# Patient Record
Sex: MALE | Race: WHITE | NOT HISPANIC OR LATINO | Employment: FULL TIME | URBAN - METROPOLITAN AREA
[De-identification: names, ages, dates, MRNs, and addresses within clinical notes are randomized per-mention and may not be internally consistent; named-entity substitution may affect disease eponyms.]

---

## 2023-06-21 LAB
FERRITIN (UG/LL) IN SER/PLAS: 27 UG/L (ref 20–300)
IRON (UG/DL) IN SER/PLAS: 63 UG/DL (ref 35–150)
IRON BINDING CAPACITY (UG/DL) IN SER/PLAS: 481 UG/DL (ref 240–445)
IRON SATURATION (%) IN SER/PLAS: 13 % (ref 25–45)

## 2025-01-07 ASSESSMENT — ENCOUNTER SYMPTOMS
SWEATS: 0
RHINORRHEA: 1
FEVER: 0
CHILLS: 0
SORE THROAT: 1
WHEEZING: 0
WEIGHT LOSS: 0
COUGH: 1
SHORTNESS OF BREATH: 0
HEADACHES: 0
MYALGIAS: 0
HEARTBURN: 0
HEMOPTYSIS: 0

## 2025-01-08 ENCOUNTER — OFFICE VISIT (OUTPATIENT)
Dept: PRIMARY CARE | Facility: CLINIC | Age: 55
End: 2025-01-08
Payer: COMMERCIAL

## 2025-01-08 VITALS
TEMPERATURE: 98.2 F | HEART RATE: 85 BPM | DIASTOLIC BLOOD PRESSURE: 90 MMHG | OXYGEN SATURATION: 97 % | SYSTOLIC BLOOD PRESSURE: 130 MMHG | BODY MASS INDEX: 27.41 KG/M2 | WEIGHT: 191 LBS

## 2025-01-08 DIAGNOSIS — E55.9 VITAMIN D DEFICIENCY: ICD-10-CM

## 2025-01-08 DIAGNOSIS — Z13.220 ENCOUNTER FOR LIPID SCREENING FOR CARDIOVASCULAR DISEASE: ICD-10-CM

## 2025-01-08 DIAGNOSIS — R74.8 ELEVATED LIVER ENZYMES: ICD-10-CM

## 2025-01-08 DIAGNOSIS — Z13.29 SCREENING FOR THYROID DISORDER: ICD-10-CM

## 2025-01-08 DIAGNOSIS — J06.9 UPPER RESPIRATORY TRACT INFECTION, UNSPECIFIED TYPE: ICD-10-CM

## 2025-01-08 DIAGNOSIS — Z13.6 ENCOUNTER FOR LIPID SCREENING FOR CARDIOVASCULAR DISEASE: ICD-10-CM

## 2025-01-08 DIAGNOSIS — R05.1 ACUTE COUGH: Primary | ICD-10-CM

## 2025-01-08 PROBLEM — G47.33 OSA (OBSTRUCTIVE SLEEP APNEA): Status: ACTIVE | Noted: 2025-01-08

## 2025-01-08 PROBLEM — L23.7 CONTACT DERMATITIS DUE TO POISON IVY: Status: ACTIVE | Noted: 2025-01-08

## 2025-01-08 PROBLEM — G47.9 DIFFICULTY SLEEPING: Status: ACTIVE | Noted: 2025-01-08

## 2025-01-08 PROBLEM — K90.0 CELIAC DISEASE (HHS-HCC): Status: ACTIVE | Noted: 2025-01-08

## 2025-01-08 PROBLEM — R79.89 LOW TESTOSTERONE: Status: ACTIVE | Noted: 2025-01-08

## 2025-01-08 PROBLEM — R91.1 PULMONARY NODULE: Status: ACTIVE | Noted: 2025-01-08

## 2025-01-08 PROBLEM — G25.81 RESTLESS LEG SYNDROME: Status: ACTIVE | Noted: 2025-01-08

## 2025-01-08 PROBLEM — I10 HYPERTENSION: Status: ACTIVE | Noted: 2025-01-08

## 2025-01-08 PROBLEM — G47.19 EXCESSIVE DAYTIME SLEEPINESS: Status: ACTIVE | Noted: 2025-01-08

## 2025-01-08 PROBLEM — D64.9 ABSOLUTE ANEMIA: Status: ACTIVE | Noted: 2025-01-08

## 2025-01-08 LAB
25(OH)D3 SERPL-MCNC: 31 NG/ML (ref 30–100)
ALBUMIN SERPL BCP-MCNC: 4.7 G/DL (ref 3.4–5)
ALP SERPL-CCNC: 61 U/L (ref 33–120)
ALT SERPL W P-5'-P-CCNC: 54 U/L (ref 10–52)
ANION GAP SERPL CALCULATED.3IONS-SCNC: 12 MMOL/L (ref 10–20)
AST SERPL W P-5'-P-CCNC: 27 U/L (ref 9–39)
BASOPHILS # BLD AUTO: 0.05 X10*3/UL (ref 0–0.1)
BASOPHILS NFR BLD AUTO: 1.1 %
BILIRUB SERPL-MCNC: 0.5 MG/DL (ref 0–1.2)
BUN SERPL-MCNC: 15 MG/DL (ref 6–23)
CALCIUM SERPL-MCNC: 9.8 MG/DL (ref 8.6–10.3)
CHLORIDE SERPL-SCNC: 102 MMOL/L (ref 98–107)
CHOLEST SERPL-MCNC: 180 MG/DL (ref 0–199)
CHOLEST/HDLC SERPL: 5.4 {RATIO}
CO2 SERPL-SCNC: 29 MMOL/L (ref 21–32)
CREAT SERPL-MCNC: 0.95 MG/DL (ref 0.5–1.3)
EGFRCR SERPLBLD CKD-EPI 2021: >90 ML/MIN/1.73M*2
EOSINOPHIL # BLD AUTO: 0.18 X10*3/UL (ref 0–0.7)
EOSINOPHIL NFR BLD AUTO: 3.9 %
ERYTHROCYTE [DISTWIDTH] IN BLOOD BY AUTOMATED COUNT: 12.1 % (ref 11.5–14.5)
GLUCOSE SERPL-MCNC: 87 MG/DL (ref 74–99)
HCT VFR BLD AUTO: 46 % (ref 41–52)
HDLC SERPL-MCNC: 33.1 MG/DL
HGB BLD-MCNC: 16.1 G/DL (ref 13.5–17.5)
IMM GRANULOCYTES # BLD AUTO: 0.02 X10*3/UL (ref 0–0.7)
IMM GRANULOCYTES NFR BLD AUTO: 0.4 % (ref 0–0.9)
LDLC SERPL CALC-MCNC: 133 MG/DL
LYMPHOCYTES # BLD AUTO: 0.87 X10*3/UL (ref 1.2–4.8)
LYMPHOCYTES NFR BLD AUTO: 18.9 %
MCH RBC QN AUTO: 29.7 PG (ref 26–34)
MCHC RBC AUTO-ENTMCNC: 35 G/DL (ref 32–36)
MCV RBC AUTO: 85 FL (ref 80–100)
MONOCYTES # BLD AUTO: 0.54 X10*3/UL (ref 0.1–1)
MONOCYTES NFR BLD AUTO: 11.7 %
NEUTROPHILS # BLD AUTO: 2.94 X10*3/UL (ref 1.2–7.7)
NEUTROPHILS NFR BLD AUTO: 64 %
NON HDL CHOLESTEROL: 147 MG/DL (ref 0–149)
NRBC BLD-RTO: 0 /100 WBCS (ref 0–0)
PLATELET # BLD AUTO: 298 X10*3/UL (ref 150–450)
POTASSIUM SERPL-SCNC: 4.2 MMOL/L (ref 3.5–5.3)
PROT SERPL-MCNC: 7.5 G/DL (ref 6.4–8.2)
RBC # BLD AUTO: 5.42 X10*6/UL (ref 4.5–5.9)
SODIUM SERPL-SCNC: 139 MMOL/L (ref 136–145)
TRIGL SERPL-MCNC: 72 MG/DL (ref 0–149)
TSH SERPL-ACNC: 1.61 MIU/L (ref 0.44–3.98)
VLDL: 14 MG/DL (ref 0–40)
WBC # BLD AUTO: 4.6 X10*3/UL (ref 4.4–11.3)

## 2025-01-08 PROCEDURE — 87636 SARSCOV2 & INF A&B AMP PRB: CPT | Performed by: NURSE PRACTITIONER

## 2025-01-08 PROCEDURE — 80061 LIPID PANEL: CPT | Performed by: NURSE PRACTITIONER

## 2025-01-08 PROCEDURE — 1036F TOBACCO NON-USER: CPT | Performed by: NURSE PRACTITIONER

## 2025-01-08 PROCEDURE — 3075F SYST BP GE 130 - 139MM HG: CPT | Performed by: NURSE PRACTITIONER

## 2025-01-08 PROCEDURE — 82306 VITAMIN D 25 HYDROXY: CPT | Performed by: NURSE PRACTITIONER

## 2025-01-08 PROCEDURE — 99214 OFFICE O/P EST MOD 30 MIN: CPT | Performed by: NURSE PRACTITIONER

## 2025-01-08 PROCEDURE — 3080F DIAST BP >= 90 MM HG: CPT | Performed by: NURSE PRACTITIONER

## 2025-01-08 PROCEDURE — 80053 COMPREHEN METABOLIC PANEL: CPT | Performed by: NURSE PRACTITIONER

## 2025-01-08 PROCEDURE — 85025 COMPLETE CBC W/AUTO DIFF WBC: CPT | Performed by: NURSE PRACTITIONER

## 2025-01-08 PROCEDURE — 84443 ASSAY THYROID STIM HORMONE: CPT | Performed by: NURSE PRACTITIONER

## 2025-01-08 RX ORDER — TESTOSTERONE CYPIONATE 200 MG/ML
200 INJECTION, SOLUTION INTRAMUSCULAR
COMMUNITY
Start: 2024-07-30 | End: 2025-01-26

## 2025-01-08 RX ORDER — BENZONATATE 200 MG/1
200 CAPSULE ORAL 3 TIMES DAILY PRN
Qty: 42 CAPSULE | Refills: 3 | Status: SHIPPED | OUTPATIENT
Start: 2025-01-08 | End: 2025-07-07

## 2025-01-08 RX ORDER — AZITHROMYCIN 250 MG/1
TABLET, FILM COATED ORAL
Qty: 6 TABLET | Refills: 0 | Status: SHIPPED | OUTPATIENT
Start: 2025-01-08 | End: 2025-01-13

## 2025-01-08 RX ORDER — OSELTAMIVIR PHOSPHATE 75 MG/1
75 CAPSULE ORAL 2 TIMES DAILY
Qty: 10 CAPSULE | Refills: 0 | Status: SHIPPED | OUTPATIENT
Start: 2025-01-08 | End: 2025-01-13

## 2025-01-08 RX ORDER — METHYLPREDNISOLONE 4 MG/1
TABLET ORAL
Qty: 21 TABLET | Refills: 0 | Status: SHIPPED | OUTPATIENT
Start: 2025-01-08 | End: 2025-01-14

## 2025-01-08 ASSESSMENT — ENCOUNTER SYMPTOMS
WHEEZING: 0
MYALGIAS: 0
RHINORRHEA: 1
WEIGHT LOSS: 0
SORE THROAT: 1
FEVER: 0
COUGH: 1
CHILLS: 0
HEADACHES: 0
SHORTNESS OF BREATH: 0
HEMOPTYSIS: 0
HEARTBURN: 0
SWEATS: 0

## 2025-01-08 ASSESSMENT — PATIENT HEALTH QUESTIONNAIRE - PHQ9
2. FEELING DOWN, DEPRESSED OR HOPELESS: NOT AT ALL
SUM OF ALL RESPONSES TO PHQ9 QUESTIONS 1 AND 2: 0
1. LITTLE INTEREST OR PLEASURE IN DOING THINGS: NOT AT ALL

## 2025-01-08 NOTE — PROGRESS NOTES
Subjective   Patient ID: Kj Hoover is a 54 y.o. male who presents for No chief complaint on file..    Here wife has influenza a , has noé sick for a few days motherin law with them, concerned for hersake, not feeling well    Cough  This is a new problem. The current episode started in the past 7 days. The problem has been gradually improving. The problem occurs every few minutes. The cough is Non-productive. Associated symptoms include nasal congestion, rhinorrhea and a sore throat. Pertinent negatives include no chest pain, chills, ear congestion, ear pain, fever, headaches, heartburn, hemoptysis, myalgias, postnasal drip, rash, shortness of breath, sweats, weight loss or wheezing. Nothing aggravates the symptoms. The treatment provided no relief.        Review of Systems   Constitutional:  Negative for chills, fever and weight loss.   HENT:  Positive for rhinorrhea and sore throat. Negative for ear pain and postnasal drip.    Respiratory:  Positive for cough. Negative for hemoptysis, shortness of breath and wheezing.    Cardiovascular:  Negative for chest pain.   Gastrointestinal:  Negative for heartburn.   Musculoskeletal:  Negative for myalgias.   Skin:  Negative for rash.   Neurological:  Negative for headaches.       Objective   /90   Pulse 85   Temp 36.8 °C (98.2 °F)   Wt 86.6 kg (191 lb)   SpO2 97%   BMI 27.41 kg/m²     Physical Exam  Constitutional:       General: He is not in acute distress.     Appearance: Normal appearance.   HENT:      Right Ear: Tympanic membrane normal.      Left Ear: Tympanic membrane normal.      Nose: Congestion present.      Mouth/Throat:      Comments: Red no exudate  Eyes:      Conjunctiva/sclera: Conjunctivae normal.   Cardiovascular:      Rate and Rhythm: Normal rate and regular rhythm.      Pulses: Normal pulses.      Heart sounds: Normal heart sounds. No murmur heard.  Pulmonary:      Breath sounds: Normal breath sounds. No wheezing.      Comments:  Loose brospamcough  Neurological:      Mental Status: He is alert.   Psychiatric:         Behavior: Behavior normal.         Assessment/Plan   Problem List Items Addressed This Visit    None  Visit Diagnoses         Codes    Acute cough    -  Primary R05.1    Relevant Medications    methylPREDNISolone (Medrol Dospak) 4 mg tablets    benzonatate (Tessalon) 200 mg capsule    Other Relevant Orders    Sars-CoV-2 and Influenza A/B PCR    CBC and Auto Differential    Upper respiratory tract infection, unspecified type     J06.9    Relevant Medications    oseltamivir (Tamiflu) 75 mg capsule    Screening for thyroid disorder     Z13.29    Relevant Orders    TSH with reflex to Free T4 if abnormal    Encounter for lipid screening for cardiovascular disease     Z13.220, Z13.6    Relevant Orders    Lipid Panel    Elevated liver enzymes     R74.8    Relevant Orders    Comprehensive Metabolic Panel    Vitamin D deficiency     E55.9    Relevant Orders    Vitamin D 25-Hydroxy,Total (for eval of Vitamin D levels)             Discussed verito;l cl with blood work rest fluids start Tamil, if not getting better over weekend Zithromax sent does not need now

## 2025-01-09 ENCOUNTER — TELEPHONE (OUTPATIENT)
Dept: PRIMARY CARE | Facility: CLINIC | Age: 55
End: 2025-01-09
Payer: COMMERCIAL

## 2025-01-09 DIAGNOSIS — E78.5 HYPERLIPIDEMIA, UNSPECIFIED HYPERLIPIDEMIA TYPE: Primary | ICD-10-CM

## 2025-01-09 LAB
FLUAV RNA RESP QL NAA+PROBE: NOT DETECTED
FLUBV RNA RESP QL NAA+PROBE: NOT DETECTED
SARS-COV-2 ORF1AB RESP QL NAA+PROBE: NOT DETECTED

## 2025-01-09 RX ORDER — ROSUVASTATIN CALCIUM 10 MG/1
10 TABLET, COATED ORAL DAILY
Qty: 100 TABLET | Refills: 0 | Status: SHIPPED | OUTPATIENT
Start: 2025-01-09 | End: 2026-02-13

## 2025-01-27 ASSESSMENT — PROMIS GLOBAL HEALTH SCALE
RATE_GENERAL_HEALTH: VERY GOOD
CARRYOUT_SOCIAL_ACTIVITIES: VERY GOOD
RATE_AVERAGE_FATIGUE: MODERATE
RATE_AVERAGE_PAIN: 1
RATE_SOCIAL_SATISFACTION: VERY GOOD
RATE_QUALITY_OF_LIFE: VERY GOOD
RATE_PHYSICAL_HEALTH: VERY GOOD
EMOTIONAL_PROBLEMS: RARELY
CARRYOUT_PHYSICAL_ACTIVITIES: COMPLETELY
RATE_MENTAL_HEALTH: VERY GOOD

## 2025-01-29 ENCOUNTER — OFFICE VISIT (OUTPATIENT)
Dept: PRIMARY CARE | Facility: CLINIC | Age: 55
End: 2025-01-29
Payer: COMMERCIAL

## 2025-01-29 VITALS
DIASTOLIC BLOOD PRESSURE: 84 MMHG | HEART RATE: 86 BPM | BODY MASS INDEX: 28 KG/M2 | OXYGEN SATURATION: 98 % | RESPIRATION RATE: 16 BRPM | WEIGHT: 195.6 LBS | TEMPERATURE: 97.7 F | SYSTOLIC BLOOD PRESSURE: 136 MMHG | HEIGHT: 70 IN

## 2025-01-29 DIAGNOSIS — E78.5 HYPERLIPIDEMIA, UNSPECIFIED HYPERLIPIDEMIA TYPE: ICD-10-CM

## 2025-01-29 DIAGNOSIS — H93.13 RINGING IN EAR, BILATERAL: ICD-10-CM

## 2025-01-29 DIAGNOSIS — B35.3 TINEA PEDIS OF RIGHT FOOT: ICD-10-CM

## 2025-01-29 DIAGNOSIS — R79.89 LOW TESTOSTERONE: ICD-10-CM

## 2025-01-29 DIAGNOSIS — Z00.00 WELL ADULT EXAM: Primary | ICD-10-CM

## 2025-01-29 DIAGNOSIS — Z12.11 SCREENING FOR COLON CANCER: ICD-10-CM

## 2025-01-29 PROCEDURE — 1036F TOBACCO NON-USER: CPT | Performed by: NURSE PRACTITIONER

## 2025-01-29 PROCEDURE — 3008F BODY MASS INDEX DOCD: CPT | Performed by: NURSE PRACTITIONER

## 2025-01-29 PROCEDURE — 99396 PREV VISIT EST AGE 40-64: CPT | Performed by: NURSE PRACTITIONER

## 2025-01-29 PROCEDURE — 3079F DIAST BP 80-89 MM HG: CPT | Performed by: NURSE PRACTITIONER

## 2025-01-29 PROCEDURE — 3075F SYST BP GE 130 - 139MM HG: CPT | Performed by: NURSE PRACTITIONER

## 2025-01-29 RX ORDER — ATORVASTATIN CALCIUM 20 MG/1
20 TABLET, FILM COATED ORAL DAILY
Qty: 100 TABLET | Refills: 3 | Status: SHIPPED | OUTPATIENT
Start: 2025-01-29 | End: 2026-03-05

## 2025-01-29 RX ORDER — KETOCONAZOLE 200 MG/1
200 TABLET ORAL DAILY
Qty: 14 TABLET | Refills: 0 | Status: SHIPPED | OUTPATIENT
Start: 2025-01-29 | End: 2025-02-12

## 2025-01-29 RX ORDER — KETOCONAZOLE 20 MG/G
CREAM TOPICAL 2 TIMES DAILY PRN
Qty: 60 G | Refills: 0 | Status: SHIPPED | OUTPATIENT
Start: 2025-01-29 | End: 2026-01-29

## 2025-01-29 RX ORDER — NYSTATIN 100000 [USP'U]/G
1 POWDER TOPICAL 2 TIMES DAILY
Qty: 30 G | Refills: 2 | Status: SHIPPED | OUTPATIENT
Start: 2025-01-29 | End: 2026-01-29

## 2025-01-29 ASSESSMENT — PAIN SCALES - GENERAL: PAINLEVEL_OUTOF10: 0-NO PAIN

## 2025-01-29 ASSESSMENT — LIFESTYLE VARIABLES: HOW MANY STANDARD DRINKS CONTAINING ALCOHOL DO YOU HAVE ON A TYPICAL DAY: PATIENT DOES NOT DRINK

## 2025-01-29 NOTE — PROGRESS NOTES
"Subjective   Patient ID: Kj Hoover is a 55 y.o. male who presents for Annual Exam (Pt here for physical. ) and Tinnitus (Pt c/o ringing in ears x years. Pt states worse in the last year).    Pt here for a welllvisit,concerns ringing in ears worse , has not had work up       Review of Systems   HENT:  Positive for tinnitus.    All other systems reviewed and are negative.      Objective   /84   Pulse 86   Temp 36.5 °C (97.7 °F)   Resp 16   Ht 1.778 m (5' 10\")   Wt 88.7 kg (195 lb 9.6 oz)   SpO2 98%   BMI 28.07 kg/m²     Physical Exam    Assessment/Plan   Problem List Items Addressed This Visit             ICD-10-CM    Low testosterone R79.89     Other Visit Diagnoses         Codes    Well adult exam    -  Primary Z00.00    Hyperlipidemia, unspecified hyperlipidemia type     E78.5    Relevant Medications    atorvastatin (Lipitor) 20 mg tablet    Ringing in ear, bilateral     H93.13    Relevant Orders    Referral to ENT    Tinea pedis of right foot     B35.3    Relevant Medications    ketoconazole (NIZOral) 200 mg tablet    ketoconazole (NIZOral) 2 % cream    nystatin (Mycostatin) 100,000 unit/gram powder    Other Relevant Orders    Referral to Dermatology    Screening for colon cancer     Z12.11    Relevant Orders    Referral to Gastroenterology        Recheck chol in 3 to 4 months discussed elevated alt will follow       "

## 2025-02-13 ENCOUNTER — APPOINTMENT (OUTPATIENT)
Dept: SLEEP MEDICINE | Facility: CLINIC | Age: 55
End: 2025-02-13
Payer: COMMERCIAL

## 2025-02-13 VITALS
BODY MASS INDEX: 28.92 KG/M2 | OXYGEN SATURATION: 96 % | HEIGHT: 70 IN | SYSTOLIC BLOOD PRESSURE: 118 MMHG | WEIGHT: 202 LBS | HEART RATE: 90 BPM | DIASTOLIC BLOOD PRESSURE: 68 MMHG

## 2025-02-13 DIAGNOSIS — G25.81 RESTLESS LEG SYNDROME: ICD-10-CM

## 2025-02-13 DIAGNOSIS — G47.33 OBSTRUCTIVE SLEEP APNEA (ADULT) (PEDIATRIC): Primary | ICD-10-CM

## 2025-02-13 DIAGNOSIS — K90.0 CELIAC DISEASE (HHS-HCC): ICD-10-CM

## 2025-02-13 DIAGNOSIS — E83.10 DISORDER OF IRON METABOLISM: ICD-10-CM

## 2025-02-13 PROCEDURE — 1036F TOBACCO NON-USER: CPT | Performed by: INTERNAL MEDICINE

## 2025-02-13 PROCEDURE — 3074F SYST BP LT 130 MM HG: CPT | Performed by: INTERNAL MEDICINE

## 2025-02-13 PROCEDURE — 3078F DIAST BP <80 MM HG: CPT | Performed by: INTERNAL MEDICINE

## 2025-02-13 PROCEDURE — 99214 OFFICE O/P EST MOD 30 MIN: CPT | Performed by: INTERNAL MEDICINE

## 2025-02-13 PROCEDURE — 3008F BODY MASS INDEX DOCD: CPT | Performed by: INTERNAL MEDICINE

## 2025-02-13 ASSESSMENT — SLEEP AND FATIGUE QUESTIONNAIRES
HOW LIKELY ARE YOU TO NOD OFF OR FALL ASLEEP WHILE WATCHING TV: SLIGHT CHANCE OF DOZING
ESS-CHAD TOTAL SCORE: 3
HOW LIKELY ARE YOU TO NOD OFF OR FALL ASLEEP IN A CAR, WHILE STOPPED FOR A FEW MINUTES IN TRAFFIC: WOULD NEVER DOZE
SITING INACTIVE IN A PUBLIC PLACE LIKE A CLASS ROOM OR A MOVIE THEATER: WOULD NEVER DOZE
HOW LIKELY ARE YOU TO NOD OFF OR FALL ASLEEP WHILE SITTING AND TALKING TO SOMEONE: WOULD NEVER DOZE
HOW LIKELY ARE YOU TO NOD OFF OR FALL ASLEEP WHEN YOU ARE A PASSENGER IN A CAR FOR AN HOUR WITHOUT A BREAK: WOULD NEVER DOZE
HOW LIKELY ARE YOU TO NOD OFF OR FALL ASLEEP WHILE SITTING AND READING: SLIGHT CHANCE OF DOZING
HOW LIKELY ARE YOU TO NOD OFF OR FALL ASLEEP WHILE SITTING QUIETLY AFTER LUNCH WITHOUT ALCOHOL: WOULD NEVER DOZE
HOW LIKELY ARE YOU TO NOD OFF OR FALL ASLEEP WHILE LYING DOWN TO REST IN THE AFTERNOON WHEN CIRCUMSTANCES PERMIT: SLIGHT CHANCE OF DOZING

## 2025-02-13 ASSESSMENT — PAIN SCALES - GENERAL: PAINLEVEL_OUTOF10: 0-NO PAIN

## 2025-02-13 NOTE — ASSESSMENT & PLAN NOTE
-RLS education provided today in clinic.   -Avoid caffeine containing beverages, chocolate, nicotine and alcohol as these can make symptoms worse.   -You can try massage, exercise, stretching or warm baths before bed time.   -We will check your ferritin level (a measure of iron stores) and start iron supplementation x 4-6 months if your ferritin level is under 75 and/or %iron saturation is <20%

## 2025-02-13 NOTE — ASSESSMENT & PLAN NOTE
"Kj \"Kj Hoover\"   has sleep apnea and requires treatment.  Kj \"Kj Hoover\" reports that his equipment is not working properly.  Kj \"Kj Wilkinson" 's current CPAP is broken beyond repair.   Motor has exceeded life expectancy  Kj \"Kj Hoover\" demonstrates previous good compliance and benefit from PAP therapy   Kj \"Kj Hoover\" is a REPAP and needs a replacement with remote monitoring capabilities.  Will order sleep study if deemed necessary and order PAP therapy through [employer], and bring him back for 31-90 day compliance  If sleep study is not required, then we will order Auto-PAP 5-15 cmH2O therapy through [employer]  and bring him back for 31-90 day compliance   "

## 2025-02-13 NOTE — ASSESSMENT & PLAN NOTE
Recommend he discuss this further with his PCP, may be secondary to celiac or other cause. He reports colonoscopy is up to date. Recheck iron levels due to RLS and replete if needed

## 2025-02-13 NOTE — LETTER
"2025     Sarah Man, APRN-CNP  [address]  [place] [facility] [address]    Patient: Kj Hoover   YOB: 1970   Date of Visit: 2025       Dear Dr. Sarah Man, APRN-CNP:    Thank you for referring Kj Hoover to me for evaluation. Below are my notes for this consultation.  If you have questions, please do not hesitate to call me. I look forward to following your patient along with you.       Sincerely,     Irvin Quinonez MD      CC: No Recipients  ______________________________________________________________________________________         Patient: Kj Hoover    [id number]  : 1970 -- AGE 55 y.o.    Provider: Irvin Quinonez MD     Location [facility]   Service Date: 2025              [facility]  Followup Visit Note    Subjective  Patient ID: Kj Hoover \"Kj Wilkinson" is a 55 y.o. male who presents for Follow-up.  HPI    Prior Sleep History:  2019: Diagnostic PSG: AHI 48, SpO2 yair 87.8.  Weight 180 pounds, BMI 26.1  2023: Office Visit: Dr. Irvin Quinonez: Continue AutoPap 5 to 15 cm H2O.  Check iron levels for RLS    Current Sleep History:  Kj Wilkinson" presents for follow-up on the management of his sleep apnea which is currently being managed with positive airway pressure therapy.   The patient's current CPAP is broken beyond repair. Kj Wilkinson" needs a replacement with remote monitoring capabilities.   A downloaded compliance report was reviewed and was interpreted by myself as follows:  > 4 hour compliance was 93%, with an average use of 7 hours and 43 minutes, with a residual AHI 2.9 on AutoPap 5 to 15 cm H2O.   He has low iron stores  He has gained 22 pounds since his last study.    Kj Hoover \"Kj Hoover\" reports  good benefit from his device.    ESS: 3     Review of Systems  Review of systems negative except as " "per HPI  Objective  /68   Pulse 90   Ht 1.778 m (5' 10\")   Wt 91.6 kg (202 lb)   SpO2 96%   BMI 28.98 kg/m²    PREVIOUS WEIGHTS:  Wt Readings from Last 3 Encounters:   02/13/25 91.6 kg (202 lb)   01/29/25 88.7 kg (195 lb 9.6 oz)   01/08/25 86.6 kg (191 lb)       Physical Exam  PHYSICAL EXAM: GENERAL: alert pleasant and cooperative no acute distress  PSYCH EXAM: alert,oriented, in NAD with a full range of affect, normal behavior and no psychotic features    Lab Results   Component Value Date    IRON 63 06/21/2023    TIBC 481 (H) 06/21/2023    FERRITIN 27 06/21/2023        Assessment/Plan  Problem List Items Addressed This Visit             ICD-10-CM    Obstructive sleep apnea (adult) (pediatric) - Primary G47.33     Kj Wilkinson"   has sleep apnea and requires treatment.  Kj Wilkinson" reports that his equipment is not working properly.  Kj Wilkinson" 's current CPAP is broken beyond repair.   Motor has exceeded life expectancy  Kj Wilkinson" demonstrates previous good compliance and benefit from PAP therapy   Kj Wilkinson" is a REPAP and needs a replacement with remote monitoring capabilities.  Will order sleep study if deemed necessary and order PAP therapy through [employer], and bring him back for 31-90 day compliance  If sleep study is not required, then we will order Auto-PAP 5-15 cmH2O therapy through [employer],  and bring him back for 31-90 day compliance          Relevant Orders    Positive Airway Pressure (PAP) Therapy    Restless leg syndrome G25.81     -RLS education provided today in clinic.   -Avoid caffeine containing beverages, chocolate, nicotine and alcohol as these can make symptoms worse.   -You can try massage, exercise, stretching or warm baths before bed time.   -We will check your ferritin level (a measure of iron stores) and start iron supplementation x 4-6 months if your ferritin level is under 75 and/or " %iron saturation is <20%         Relevant Orders    Ferritin    Iron and TIBC    Celiac disease ([facility]-HCC) K90.0     Can be a contributing factor for low iron levels. 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 He reports colonoscopy is up to date. Recheck iron levels due to RLS and replete if needed         Relevant Orders    Ferritin    Iron and TIBC

## 2025-02-13 NOTE — PROGRESS NOTES
"     Patient: Kj Hoover    [id number]  : 1970 -- AGE 55 y.o.    Provider: Irvin Quinonez MD     Location [facility]   Service Date: 2025              [facility]  Followup Visit Note    Subjective   Patient ID: Kj Wilkinson" is a 55 y.o. male who presents for Follow-up.  HPI    Prior Sleep History:  2019: Diagnostic PSG: AHI 48, SpO2 yair 87.8.  Weight 180 pounds, BMI 26.1  2023: Office Visit: Dr. Irvin Quinonez: Continue AutoPap 5 to 15 cm H2O.  Check iron levels for RLS    Current Sleep History:  Kj Wilkinson" presents for follow-up on the management of his sleep apnea which is currently being managed with positive airway pressure therapy.   The patient's current CPAP is broken beyond repair. Kj Wilkinson" needs a replacement with remote monitoring capabilities.   A downloaded compliance report was reviewed and was interpreted by myself as follows:  > 4 hour compliance was 93%, with an average use of 7 hours and 43 minutes, with a residual AHI 2.9 on AutoPap 5 to 15 cm H2O.   He has low iron stores  He has gained 22 pounds since his last study.    Kj Wilkinson" reports  good benefit from his device.    ESS: 3     Review of Systems  Review of systems negative except as per HPI  Objective   /68   Pulse 90   Ht 1.778 m (5' 10\")   Wt 91.6 kg (202 lb)   SpO2 96%   BMI 28.98 kg/m²    PREVIOUS WEIGHTS:  Wt Readings from Last 3 Encounters:   25 91.6 kg (202 lb)   25 88.7 kg (195 lb 9.6 oz)   25 86.6 kg (191 lb)       Physical Exam  PHYSICAL EXAM: GENERAL: alert pleasant and cooperative no acute distress  PSYCH EXAM: alert,oriented, in NAD with a full range of affect, normal behavior and no psychotic features    Lab Results   Component Value Date    IRON 63 2023    TIBC 481 (H) 2023    FERRITIN 27 2023        Assessment/Plan " "  Problem List Items Addressed This Visit             ICD-10-CM    Obstructive sleep apnea (adult) (pediatric) - Primary G47.33     Kj Hoover\"   has sleep apnea and requires treatment.  Kj Wilkinson" reports that his equipment is not working properly.  Kj Wilkinson" 's current CPAP is broken beyond repair.   Motor has exceeded life expectancy  Kj Wilkinson" demonstrates previous good compliance and benefit from PAP therapy   Kj Wilkinson" is a REPAP and needs a replacement with remote monitoring capabilities.  Will order sleep study if deemed necessary and order PAP therapy through [employer], and bring him back for 31-90 day compliance  If sleep study is not required, then we will order Auto-PAP 5-15 cmH2O therapy through [employer],  and bring him back for 31-90 day compliance          Relevant Orders    Positive Airway Pressure (PAP) Therapy    Restless leg syndrome G25.81     -RLS education provided today in clinic.   -Avoid caffeine containing beverages, chocolate, nicotine and alcohol as these can make symptoms worse.   -You can try massage, exercise, stretching or warm baths before bed time.   -We will check your ferritin level (a measure of iron stores) and start iron supplementation x 4-6 months if your ferritin level is under 75 and/or %iron saturation is <20%         Relevant Orders    Ferritin    Iron and TIBC    Celiac disease ([facility]) K90.0     Can be a contributing factor for low iron levels. 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 He reports colonoscopy is up to date. Recheck iron levels due to RLS and replete if needed         Relevant Orders    Ferritin    Iron and TIBC            "

## 2025-02-14 LAB
FERRITIN SERPL-MCNC: 82 NG/ML (ref 38–380)
IRON SATN MFR SERPL: 24 % (CALC) (ref 20–48)
IRON SERPL-MCNC: 95 MCG/DL (ref 50–180)
TIBC SERPL-MCNC: 389 MCG/DL (CALC) (ref 250–425)

## 2025-02-17 ENCOUNTER — TELEPHONE (OUTPATIENT)
Dept: GASTROENTEROLOGY | Facility: HOSPITAL | Age: 55
End: 2025-02-17
Payer: COMMERCIAL

## 2025-02-17 NOTE — TELEPHONE ENCOUNTER
Call made to patient to assist with scheduling NPV with Dr. Hernandez.    There was no answer, voicemail was left.

## 2025-03-25 ENCOUNTER — OFFICE VISIT (OUTPATIENT)
Dept: GASTROENTEROLOGY | Facility: HOSPITAL | Age: 55
End: 2025-03-25
Payer: COMMERCIAL

## 2025-03-25 VITALS
SYSTOLIC BLOOD PRESSURE: 138 MMHG | RESPIRATION RATE: 18 BRPM | WEIGHT: 206 LBS | HEART RATE: 88 BPM | TEMPERATURE: 97.3 F | OXYGEN SATURATION: 96 % | BODY MASS INDEX: 29.56 KG/M2 | DIASTOLIC BLOOD PRESSURE: 94 MMHG

## 2025-03-25 DIAGNOSIS — R74.8 ELEVATED LIVER ENZYMES: Primary | ICD-10-CM

## 2025-03-25 DIAGNOSIS — Z12.11 COLON CANCER SCREENING: ICD-10-CM

## 2025-03-25 DIAGNOSIS — R14.0 BLOATING: ICD-10-CM

## 2025-03-25 PROCEDURE — 3075F SYST BP GE 130 - 139MM HG: CPT | Performed by: STUDENT IN AN ORGANIZED HEALTH CARE EDUCATION/TRAINING PROGRAM

## 2025-03-25 PROCEDURE — 99214 OFFICE O/P EST MOD 30 MIN: CPT | Performed by: STUDENT IN AN ORGANIZED HEALTH CARE EDUCATION/TRAINING PROGRAM

## 2025-03-25 PROCEDURE — 99204 OFFICE O/P NEW MOD 45 MIN: CPT | Performed by: STUDENT IN AN ORGANIZED HEALTH CARE EDUCATION/TRAINING PROGRAM

## 2025-03-25 PROCEDURE — 3080F DIAST BP >= 90 MM HG: CPT | Performed by: STUDENT IN AN ORGANIZED HEALTH CARE EDUCATION/TRAINING PROGRAM

## 2025-03-25 RX ORDER — POLYETHYLENE GLYCOL 3350, SODIUM CHLORIDE, SODIUM BICARBONATE, POTASSIUM CHLORIDE 420; 11.2; 5.72; 1.48 G/4L; G/4L; G/4L; G/4L
4000 POWDER, FOR SOLUTION ORAL ONCE
Qty: 4000 ML | Refills: 0 | Status: SHIPPED | OUTPATIENT
Start: 2025-03-25 | End: 2025-03-25

## 2025-03-25 ASSESSMENT — PAIN SCALES - GENERAL: PAINLEVEL_OUTOF10: 0-NO PAIN

## 2025-03-25 NOTE — PROGRESS NOTES
This is a 54yo F w/ PMH of HLD, tinnitus who has been referred for colon cancer screening.    Per pt, he had a colonoscopy >7 yrs ago (results unknown) at [facility]SH. He does endorses sensitivity and occasional intolerance to gluten in terms of bloating and abd discomfort, occasional diarrhea.    Denies abd pain, n/v/d, dysphagia, heartburn, wt loss, 12 point ROS done and neg unless otherwise stated.    Labs Jan 2025: Hb 16, plt 253, Cr 0.97, AST 37, ALT mildly elevated 71, ferritin 82, TIBC% 24%    PMH/PSH: As above    SH: denies smoking, alcohol, IVDU    FH: denies FH of esophageal, gastric or colon CA    PE:    Gen: AXOX3. NAD     HEENT: NC/AT.      Eyes: anicteric sclerae     CV: RRR    Pulm: non labored breathing    Abd: NTND. Tympanitic. no rebound     Ext: no edema    Skin: Non jaundiced     A/P: This is a 54yo F w/ PMH of HLD, tinnitus who has been referred for colon cancer screening.    Per pt, he had a colonoscopy >7 yrs ago (results unknown) at OSH.  No FH.     He does endorses sensitivity and occasional intolerance to gluten in terms of bloating and abd discomfort, occasional diarrhea.    Additionally, ALT is mildly elevated, denies alcohol, likely 2/2 MASLD.    Plan:    -order labs today: Hep B and C serologies, celiac panel  -liver ultrasound  -avg risk screening colonoscopy     We will see you back in 4 months

## 2025-03-25 NOTE — PATIENT INSTRUCTIONS
Thank you for coming to GI clinic, we will:    -order labs today  -please CALL  to schedule liver ultrasound  -please CALL  to schedule your colonoscopy with Dr Shaw Hernandez in May 2025    We will see you back in 4 months

## 2025-03-25 NOTE — LETTER
March 25, 2025     Patient: Kj Hoover   YOB: 1970   Date of Visit: 3/25/2025       To Whom It May Concern:    Kj Hoover was seen in my clinic on 3/25/2025 at 1:00 pm. Please excuse Kj for his absence from work on this day to make the appointment.    If you have any questions or concerns, please don't hesitate to call.         Sincerely,         Shaw Hernandez MD        CC: No Recipients

## 2025-03-29 LAB
GLIADIN IGA SER IA-ACNC: NORMAL
GLIADIN IGG SER IA-ACNC: NORMAL
HBV CORE AB SERPL QL IA: NORMAL
HBV CORE IGM SERPL QL IA: NORMAL
HBV SURFACE AB SERPL IA-ACNC: <5 MIU/ML
HBV SURFACE AG SERPL QL IA: NORMAL
HCV AB SERPL QL IA: NORMAL
IGA SERPL-MCNC: NORMAL MG/DL
TTG IGA SER-ACNC: NORMAL
TTG IGG SER-ACNC: NORMAL

## 2025-03-31 LAB
GLIADIN IGA SER IA-ACNC: 68.2 U/ML
GLIADIN IGG SER IA-ACNC: 14.1 U/ML
HBV CORE AB SERPL QL IA: NORMAL
HBV CORE IGM SERPL QL IA: NORMAL
HBV SURFACE AB SERPL IA-ACNC: <5 MIU/ML
HBV SURFACE AG SERPL QL IA: NORMAL
HCV AB SERPL QL IA: NORMAL
IGA SERPL-MCNC: 317 MG/DL (ref 47–310)
TTG IGA SER-ACNC: 23.4 U/ML
TTG IGG SER-ACNC: 1.2 U/ML

## 2025-04-01 DIAGNOSIS — K90.0 CELIAC DISEASE (HHS-HCC): Primary | ICD-10-CM

## 2025-04-10 ENCOUNTER — HOSPITAL ENCOUNTER (OUTPATIENT)
Dept: RADIOLOGY | Facility: HOSPITAL | Age: 55
Discharge: HOME | End: 2025-04-10
Payer: COMMERCIAL

## 2025-04-10 ENCOUNTER — APPOINTMENT (OUTPATIENT)
Dept: AUDIOLOGY | Facility: CLINIC | Age: 55
End: 2025-04-10
Payer: COMMERCIAL

## 2025-04-10 ENCOUNTER — APPOINTMENT (OUTPATIENT)
Dept: OTOLARYNGOLOGY | Facility: CLINIC | Age: 55
End: 2025-04-10
Payer: COMMERCIAL

## 2025-04-10 VITALS — HEIGHT: 70 IN | WEIGHT: 206 LBS | BODY MASS INDEX: 29.49 KG/M2

## 2025-04-10 DIAGNOSIS — H93.13 TINNITUS OF BOTH EARS: Primary | ICD-10-CM

## 2025-04-10 DIAGNOSIS — H93.8X3 PRESSURE SENSATION IN BOTH EARS: ICD-10-CM

## 2025-04-10 DIAGNOSIS — R74.8 ELEVATED LIVER ENZYMES: ICD-10-CM

## 2025-04-10 PROCEDURE — 3008F BODY MASS INDEX DOCD: CPT | Performed by: OTOLARYNGOLOGY

## 2025-04-10 PROCEDURE — 1036F TOBACCO NON-USER: CPT | Performed by: OTOLARYNGOLOGY

## 2025-04-10 PROCEDURE — 92567 TYMPANOMETRY: CPT

## 2025-04-10 PROCEDURE — 76705 ECHO EXAM OF ABDOMEN: CPT

## 2025-04-10 PROCEDURE — 92557 COMPREHENSIVE HEARING TEST: CPT

## 2025-04-10 PROCEDURE — 99203 OFFICE O/P NEW LOW 30 MIN: CPT | Performed by: OTOLARYNGOLOGY

## 2025-04-10 NOTE — PROGRESS NOTES
"Subjective   Patient ID: Kj Hoover \"Kj Wilkinson" is a 55 y.o. male  HPI  Patient is complaining of a chronic history of bilateral nonpulsatile tinnitus.  He has no otalgia and no otorrhea and no vertigo.  He has not noticed any sudden change in his hearing.    Review of Systems   HENT:  Positive for tinnitus.         Scratchy throat   Eyes:         Watery eyes       Objective   Physical Exam  The following elements of a brief ear nose and throat exam were performed: External ear canals and tympanic membranes, external nose and nasal passages, oral cavity, palpation of the neck, percussion of the face, palpation of the thyroid.    The ear canals and the tympanic membranes are clear and mobile.  The remainder his exam was within normal limits.  Audiogram and tympanogram were ordered obtained and results were reviewed today and reveal borderline normal hearing bilaterally.    Assessment/Plan   Diagnoses and all orders for this visit:  Tinnitus of both ears (Primary)  -     Tympanometry Only; Future  -     Comprehensive hearing test; Future     Chronic bilateral nonpulsatile tinnitus with otherwise normal borderline hearing noted.  The patient was reassured and counseled on tinnitus mitigation and he will follow-up as needed.  "

## 2025-04-10 NOTE — PROGRESS NOTES
"AUDIOLOGIC EVALUATION    Name:  jK Hoover \"Kj Hoover\"  :  1970  Age:  55 y.o.    HISTORY:     Patient reported bilateral tinnitus that he feels has been worsening over the last few months. He endorsed some hearing difficulty, especially in background noise, occasional bilateral ear pressure, and a history of occupational noise exposure.   He  denied tinnitus, otalgia, aural pressure/fullness, otorrhea, dizziness, noise exposure, and prior ear surgeries.    EVALUATION:    See Audiogram.    RESULTS:    Otoscopy:  Right:  Clear canal, normal color and appearance of tympanic membrane.   Left:   Clear canal, normal color and appearance of tympanic membrane.     Tympanometry:  Right:   Normal tympanic membrane mobility and middle ear pressure.  Left:   Normal tympanic membrane mobility and middle ear pressure.    Hearing Sensitivity:  Right: Normal hearing sensitivity.   Left: Normal hearing sensitivity.     Word Recognition Score (NU-6 ordered 1-2):  Right: Excellent (100%) at 50 dBHL.  Left: Excellent (100%) at 50 dBHL.    ASSESSMENT AND PLAN:    - Continue medical follow-up with Dr. Dash.  - Counseled regarding tinnitus and tinnitus management strategies.  - Monitor and recheck hearing as warranted.  - Counseled regarding results and recommendations.    NEYDA Shen., B.S.  Audiology Student Extern    Kenny Krishna  Clinical Audiologist    "

## 2025-04-17 ENCOUNTER — APPOINTMENT (OUTPATIENT)
Dept: AUDIOLOGY | Facility: CLINIC | Age: 55
End: 2025-04-17
Payer: COMMERCIAL

## 2025-04-17 ENCOUNTER — APPOINTMENT (OUTPATIENT)
Dept: OTOLARYNGOLOGY | Facility: CLINIC | Age: 55
End: 2025-04-17
Payer: COMMERCIAL

## 2025-05-06 ENCOUNTER — ANESTHESIA EVENT (OUTPATIENT)
Dept: GASTROENTEROLOGY | Facility: HOSPITAL | Age: 55
End: 2025-05-06
Payer: COMMERCIAL

## 2025-05-06 ENCOUNTER — HOSPITAL ENCOUNTER (OUTPATIENT)
Dept: GASTROENTEROLOGY | Facility: HOSPITAL | Age: 55
Discharge: HOME | End: 2025-05-06
Payer: COMMERCIAL

## 2025-05-06 ENCOUNTER — ANESTHESIA (OUTPATIENT)
Dept: GASTROENTEROLOGY | Facility: HOSPITAL | Age: 55
End: 2025-05-06
Payer: COMMERCIAL

## 2025-05-06 VITALS
HEIGHT: 70 IN | HEART RATE: 77 BPM | WEIGHT: 200 LBS | BODY MASS INDEX: 28.63 KG/M2 | DIASTOLIC BLOOD PRESSURE: 92 MMHG | SYSTOLIC BLOOD PRESSURE: 127 MMHG | OXYGEN SATURATION: 95 % | TEMPERATURE: 97.1 F | RESPIRATION RATE: 13 BRPM

## 2025-05-06 DIAGNOSIS — Z12.11 COLON CANCER SCREENING: Primary | ICD-10-CM

## 2025-05-06 DIAGNOSIS — K90.0 CELIAC DISEASE (HHS-HCC): ICD-10-CM

## 2025-05-06 PROCEDURE — 45385 COLONOSCOPY W/LESION REMOVAL: CPT | Performed by: STUDENT IN AN ORGANIZED HEALTH CARE EDUCATION/TRAINING PROGRAM

## 2025-05-06 PROCEDURE — 7100000010 HC PHASE TWO TIME - EACH INCREMENTAL 1 MINUTE

## 2025-05-06 PROCEDURE — 43239 EGD BIOPSY SINGLE/MULTIPLE: CPT | Performed by: STUDENT IN AN ORGANIZED HEALTH CARE EDUCATION/TRAINING PROGRAM

## 2025-05-06 PROCEDURE — A45385 PR COLONOSCOPY,REMV LESN,SNARE: Performed by: STUDENT IN AN ORGANIZED HEALTH CARE EDUCATION/TRAINING PROGRAM

## 2025-05-06 PROCEDURE — 2720000007 HC OR 272 NO HCPCS

## 2025-05-06 PROCEDURE — A45385 PR COLONOSCOPY,REMV LESN,SNARE: Performed by: ANESTHESIOLOGIST ASSISTANT

## 2025-05-06 PROCEDURE — 2500000004 HC RX 250 GENERAL PHARMACY W/ HCPCS (ALT 636 FOR OP/ED): Performed by: ANESTHESIOLOGIST ASSISTANT

## 2025-05-06 PROCEDURE — 7100000009 HC PHASE TWO TIME - INITIAL BASE CHARGE

## 2025-05-06 PROCEDURE — 45380 COLONOSCOPY AND BIOPSY: CPT | Performed by: STUDENT IN AN ORGANIZED HEALTH CARE EDUCATION/TRAINING PROGRAM

## 2025-05-06 PROCEDURE — 3700000002 HC GENERAL ANESTHESIA TIME - EACH INCREMENTAL 1 MINUTE

## 2025-05-06 PROCEDURE — 3700000001 HC GENERAL ANESTHESIA TIME - INITIAL BASE CHARGE

## 2025-05-06 RX ORDER — HYDROMORPHONE HYDROCHLORIDE 1 MG/ML
0.2 INJECTION, SOLUTION INTRAMUSCULAR; INTRAVENOUS; SUBCUTANEOUS EVERY 5 MIN PRN
Status: DISCONTINUED | OUTPATIENT
Start: 2025-05-06 | End: 2025-05-07 | Stop reason: HOSPADM

## 2025-05-06 RX ORDER — OXYCODONE HYDROCHLORIDE 5 MG/1
5 TABLET ORAL EVERY 4 HOURS PRN
Status: DISCONTINUED | OUTPATIENT
Start: 2025-05-06 | End: 2025-05-07 | Stop reason: HOSPADM

## 2025-05-06 RX ORDER — DROPERIDOL 2.5 MG/ML
0.62 INJECTION, SOLUTION INTRAMUSCULAR; INTRAVENOUS ONCE AS NEEDED
Status: DISCONTINUED | OUTPATIENT
Start: 2025-05-06 | End: 2025-05-07 | Stop reason: HOSPADM

## 2025-05-06 RX ORDER — ONDANSETRON HYDROCHLORIDE 2 MG/ML
4 INJECTION, SOLUTION INTRAVENOUS ONCE AS NEEDED
Status: DISCONTINUED | OUTPATIENT
Start: 2025-05-06 | End: 2025-05-07 | Stop reason: HOSPADM

## 2025-05-06 RX ORDER — PROPOFOL 10 MG/ML
INJECTION, EMULSION INTRAVENOUS CONTINUOUS PRN
Status: DISCONTINUED | OUTPATIENT
Start: 2025-05-06 | End: 2025-05-06

## 2025-05-06 RX ORDER — SODIUM CHLORIDE, SODIUM LACTATE, POTASSIUM CHLORIDE, CALCIUM CHLORIDE 600; 310; 30; 20 MG/100ML; MG/100ML; MG/100ML; MG/100ML
INJECTION, SOLUTION INTRAVENOUS CONTINUOUS PRN
Status: DISCONTINUED | OUTPATIENT
Start: 2025-05-06 | End: 2025-05-06

## 2025-05-06 RX ORDER — PROCHLORPERAZINE EDISYLATE 5 MG/ML
5 INJECTION INTRAMUSCULAR; INTRAVENOUS ONCE AS NEEDED
Status: DISCONTINUED | OUTPATIENT
Start: 2025-05-06 | End: 2025-05-07 | Stop reason: HOSPADM

## 2025-05-06 RX ORDER — FENTANYL CITRATE 50 UG/ML
INJECTION, SOLUTION INTRAMUSCULAR; INTRAVENOUS AS NEEDED
Status: DISCONTINUED | OUTPATIENT
Start: 2025-05-06 | End: 2025-05-06

## 2025-05-06 RX ORDER — METHOCARBAMOL 100 MG/ML
500 INJECTION, SOLUTION INTRAMUSCULAR; INTRAVENOUS ONCE
Status: DISCONTINUED | OUTPATIENT
Start: 2025-05-06 | End: 2025-05-07 | Stop reason: HOSPADM

## 2025-05-06 RX ORDER — OXYCODONE HYDROCHLORIDE 5 MG/1
10 TABLET ORAL EVERY 4 HOURS PRN
Status: DISCONTINUED | OUTPATIENT
Start: 2025-05-06 | End: 2025-05-07 | Stop reason: HOSPADM

## 2025-05-06 RX ORDER — MIDAZOLAM HYDROCHLORIDE 1 MG/ML
INJECTION, SOLUTION INTRAMUSCULAR; INTRAVENOUS AS NEEDED
Status: DISCONTINUED | OUTPATIENT
Start: 2025-05-06 | End: 2025-05-06

## 2025-05-06 RX ORDER — ACETAMINOPHEN 325 MG/1
650 TABLET ORAL EVERY 4 HOURS PRN
Status: DISCONTINUED | OUTPATIENT
Start: 2025-05-06 | End: 2025-05-07 | Stop reason: HOSPADM

## 2025-05-06 RX ORDER — HYDROMORPHONE HYDROCHLORIDE 1 MG/ML
0.1 INJECTION, SOLUTION INTRAMUSCULAR; INTRAVENOUS; SUBCUTANEOUS EVERY 5 MIN PRN
Status: DISCONTINUED | OUTPATIENT
Start: 2025-05-06 | End: 2025-05-07 | Stop reason: HOSPADM

## 2025-05-06 RX ORDER — HYDROMORPHONE HYDROCHLORIDE 1 MG/ML
0.5 INJECTION, SOLUTION INTRAMUSCULAR; INTRAVENOUS; SUBCUTANEOUS EVERY 5 MIN PRN
Status: DISCONTINUED | OUTPATIENT
Start: 2025-05-06 | End: 2025-05-07 | Stop reason: HOSPADM

## 2025-05-06 RX ADMIN — FENTANYL CITRATE 25 MCG: 50 INJECTION, SOLUTION INTRAMUSCULAR; INTRAVENOUS at 13:17

## 2025-05-06 RX ADMIN — SODIUM CHLORIDE, POTASSIUM CHLORIDE, SODIUM LACTATE AND CALCIUM CHLORIDE: 600; 310; 30; 20 INJECTION, SOLUTION INTRAVENOUS at 12:53

## 2025-05-06 RX ADMIN — FENTANYL CITRATE 25 MCG: 50 INJECTION, SOLUTION INTRAMUSCULAR; INTRAVENOUS at 13:01

## 2025-05-06 RX ADMIN — FENTANYL CITRATE 50 MCG: 50 INJECTION, SOLUTION INTRAMUSCULAR; INTRAVENOUS at 13:04

## 2025-05-06 RX ADMIN — MIDAZOLAM 2 MG: 1 INJECTION INTRAMUSCULAR; INTRAVENOUS at 12:58

## 2025-05-06 RX ADMIN — PROPOFOL 200 MCG/KG/MIN: 10 INJECTION, EMULSION INTRAVENOUS at 12:59

## 2025-05-06 ASSESSMENT — PAIN SCALES - GENERAL
PAINLEVEL_OUTOF10: 0 - NO PAIN

## 2025-05-06 ASSESSMENT — ENCOUNTER SYMPTOMS: ABDOMINAL DISTENTION: 1

## 2025-05-06 ASSESSMENT — PAIN - FUNCTIONAL ASSESSMENT: PAIN_FUNCTIONAL_ASSESSMENT: 0-10

## 2025-05-06 NOTE — ANESTHESIA PREPROCEDURE EVALUATION
"Patient: Kj Hoover \"Kj Hoover\"    Procedure Information       Date/Time: 05/06/25 1110    Scheduled providers: Shaw Hernandez MD    Procedure: COLONOSCOPY    Location: [facility]            Relevant Problems   Anesthesia (within normal limits)      Cardiac   (+) Hypertension      GI  Gluten sensitivity      Hematology   (+) Absolute anemia       Clinical information reviewed:   Tobacco  Allergies  Meds   Med Hx  Surg Hx   Fam Hx  Soc Hx        NPO Detail:  NPO/Void Status  Date of Last Liquid: 05/06/25  Time of Last Liquid: 0500  Date of Last Solid: 05/04/25  Last Intake Type: Clear fluids         Physical Exam    Airway  Mallampati: II  TM distance: >3 FB  Neck ROM: full  Mouth opening: 3 or more finger widths     Cardiovascular   Rhythm: regular  Rate: normal     Dental - normal exam     Pulmonary Breath sounds clear to auscultation     Abdominal            Anesthesia Plan    History of general anesthesia?: no  History of complications of general anesthesia?: no    ASA 2     MAC     intravenous induction   Anesthetic plan and risks discussed with patient.    Plan discussed with [facility].      "

## 2025-05-06 NOTE — ANESTHESIA POSTPROCEDURE EVALUATION
"Patient: Kj Hoover \"Kj Hoover\"    Procedure Summary       Date: 05/06/25 Room / Location: [facility]    Anesthesia Start: 1253 Anesthesia Stop: 1352    Procedure: COLONOSCOPY Diagnosis:       Colon cancer screening      Colon cancer screening    Scheduled Providers: Shaw Hernandez MD Responsible Provider: Patel Reinoso MD    Anesthesia Type: MAC ASA Status: 2            Anesthesia Type: MAC    Vitals Value Taken Time   /86 05/06/25 13:50   Temp 36.2 °C (97.1 °F) 05/06/25 13:50   Pulse 83 05/06/25 13:50   Resp 21 05/06/25 13:50   SpO2 96 % 05/06/25 13:50       Anesthesia Post Evaluation    Patient location during evaluation: PACU  Patient participation: complete - patient participated  Level of consciousness: awake  Pain management: adequate  Airway patency: patent  Cardiovascular status: acceptable  Respiratory status: acceptable  Hydration status: acceptable  Postoperative Nausea and Vomiting: none        No notable events documented.    "

## 2025-05-06 NOTE — DISCHARGE INSTRUCTIONS

## 2025-05-06 NOTE — H&P
"History Of Present Illness  Kj Hoover \"Kj Wilkinson" is a 55 y.o. male presenting for an EGD to confirm celiac disease and screening colonoscopy.    The patient had a colonoscopy >7 yrs ago (results unknown) at [facility]SH.     Past Medical History  Medical History[1]  Surgical History  Surgical History[2]  Social History  He reports that he has never smoked. He has never used smokeless tobacco. He reports that he does not currently use alcohol. He reports that he does not use drugs.    Family History  Family History[3]     Allergies  Allergies[4]  Review of Systems   Gastrointestinal:  Positive for abdominal distention.   All other systems reviewed and are negative.     Physical Exam  Vitals reviewed.   Constitutional:       Appearance: Normal appearance.   Eyes:      Pupils: Pupils are equal, round, and reactive to light.   Cardiovascular:      Rate and Rhythm: Normal rate.      Heart sounds: Normal heart sounds.   Pulmonary:      Breath sounds: Normal breath sounds.   Abdominal:      General: There is distension.      Palpations: Abdomen is soft.      Tenderness: There is no abdominal tenderness. There is no guarding or rebound.   Neurological:      General: No focal deficit present.      Mental Status: He is alert and oriented to person, place, and time.   Psychiatric:         Mood and Affect: Mood normal.         Behavior: Behavior normal.       Last Recorded Vitals  Blood pressure (!) 136/97, pulse 91, temperature 36.8 °C (98.2 °F), temperature source Temporal, resp. rate 16, height 1.778 m (5' 10\"), weight 90.7 kg (200 lb), SpO2 96%.    Assessment/Plan   Kj Hoover \"Kj Wilkinson" is a 55 y.o. male presenting for an EGD to confirm celiac disease and screening colonoscopy.     Ester Hernandez MD         [1]   Past Medical History:  Diagnosis Date    Allergic Spring    Anxiety     Hypertension    [2] History reviewed. No pertinent surgical history.  [3]   Family History  Problem Relation Name Age of Onset "    Heart attack Father 75     Stroke Father 75     Cancer Other      Hypertension Other      Lung disease Other      Allergies Other     [4]   Allergies  Allergen Reactions    Penicillins Unknown    Gluten Diarrhea, GI Upset and Rash

## 2025-05-15 LAB
LAB AP ASR DISCLAIMER: NORMAL
LABORATORY COMMENT REPORT: NORMAL
PATH REPORT.FINAL DX SPEC: NORMAL
PATH REPORT.GROSS SPEC: NORMAL
PATH REPORT.RELEVANT HX SPEC: NORMAL
PATH REPORT.TOTAL CANCER: NORMAL

## 2025-05-23 LAB
ELECTRONICALLY SIGNED BY: NORMAL
H. PYLORI DRUG SUSCEPTIBILITY RESULTS: NORMAL

## 2025-05-23 PROCEDURE — 87900 PHENOTYPE INFECT AGENT DRUG: CPT | Performed by: STUDENT IN AN ORGANIZED HEALTH CARE EDUCATION/TRAINING PROGRAM

## 2025-06-03 ENCOUNTER — APPOINTMENT (OUTPATIENT)
Dept: DERMATOLOGY | Facility: CLINIC | Age: 55
End: 2025-06-03
Payer: COMMERCIAL

## 2025-06-10 ENCOUNTER — APPOINTMENT (OUTPATIENT)
Dept: GASTROENTEROLOGY | Facility: HOSPITAL | Age: 55
End: 2025-06-10
Payer: COMMERCIAL

## 2025-06-10 VITALS
WEIGHT: 207 LBS | SYSTOLIC BLOOD PRESSURE: 125 MMHG | TEMPERATURE: 97.2 F | HEART RATE: 75 BPM | BODY MASS INDEX: 29.7 KG/M2 | OXYGEN SATURATION: 96 % | DIASTOLIC BLOOD PRESSURE: 87 MMHG

## 2025-06-10 DIAGNOSIS — A04.8 BACTERIAL INFECTION DUE TO H. PYLORI: Primary | ICD-10-CM

## 2025-06-10 DIAGNOSIS — K90.0 CELIAC DISEASE (HHS-HCC): ICD-10-CM

## 2025-06-10 PROCEDURE — 3079F DIAST BP 80-89 MM HG: CPT | Performed by: STUDENT IN AN ORGANIZED HEALTH CARE EDUCATION/TRAINING PROGRAM

## 2025-06-10 PROCEDURE — 99215 OFFICE O/P EST HI 40 MIN: CPT | Performed by: STUDENT IN AN ORGANIZED HEALTH CARE EDUCATION/TRAINING PROGRAM

## 2025-06-10 PROCEDURE — 99212 OFFICE O/P EST SF 10 MIN: CPT | Performed by: STUDENT IN AN ORGANIZED HEALTH CARE EDUCATION/TRAINING PROGRAM

## 2025-06-10 PROCEDURE — 1036F TOBACCO NON-USER: CPT | Performed by: STUDENT IN AN ORGANIZED HEALTH CARE EDUCATION/TRAINING PROGRAM

## 2025-06-10 PROCEDURE — 3074F SYST BP LT 130 MM HG: CPT | Performed by: STUDENT IN AN ORGANIZED HEALTH CARE EDUCATION/TRAINING PROGRAM

## 2025-06-10 RX ORDER — CLOTRIMAZOLE AND BETAMETHASONE DIPROPIONATE 10; .64 MG/G; MG/G
CREAM TOPICAL
COMMUNITY
Start: 2025-06-09

## 2025-06-10 RX ORDER — BISMUTH SUBSALICYLATE 262 MG/1
262 TABLET ORAL
Qty: 56 TABLET | Refills: 0 | Status: SHIPPED | OUTPATIENT
Start: 2025-06-10 | End: 2025-06-24

## 2025-06-10 RX ORDER — PANTOPRAZOLE SODIUM 40 MG/1
40 TABLET, DELAYED RELEASE ORAL 2 TIMES DAILY
Qty: 28 TABLET | Refills: 0 | Status: SHIPPED | OUTPATIENT
Start: 2025-06-10 | End: 2025-06-24

## 2025-06-10 RX ORDER — ECONAZOLE NITRATE 10 MG/G
CREAM TOPICAL
COMMUNITY
Start: 2025-04-15

## 2025-06-10 RX ORDER — METRONIDAZOLE 500 MG/1
500 TABLET ORAL 3 TIMES DAILY
Qty: 42 TABLET | Refills: 0 | Status: SHIPPED | OUTPATIENT
Start: 2025-06-10 | End: 2025-06-24

## 2025-06-10 RX ORDER — TETRACYCLINE HYDROCHLORIDE 500 MG/1
500 CAPSULE ORAL 4 TIMES DAILY
Qty: 56 CAPSULE | Refills: 0 | Status: SHIPPED | OUTPATIENT
Start: 2025-06-10 | End: 2025-06-24

## 2025-06-10 SDOH — ECONOMIC STABILITY: FOOD INSECURITY: WITHIN THE PAST 12 MONTHS, YOU WORRIED THAT YOUR FOOD WOULD RUN OUT BEFORE YOU GOT MONEY TO BUY MORE.: NEVER TRUE

## 2025-06-10 SDOH — ECONOMIC STABILITY: FOOD INSECURITY: WITHIN THE PAST 12 MONTHS, THE FOOD YOU BOUGHT JUST DIDN'T LAST AND YOU DIDN'T HAVE MONEY TO GET MORE.: NEVER TRUE

## 2025-06-10 ASSESSMENT — PATIENT HEALTH QUESTIONNAIRE - PHQ9
SUM OF ALL RESPONSES TO PHQ9 QUESTIONS 1 & 2: 0
2. FEELING DOWN, DEPRESSED OR HOPELESS: NOT AT ALL
1. LITTLE INTEREST OR PLEASURE IN DOING THINGS: NOT AT ALL

## 2025-06-10 ASSESSMENT — LIFESTYLE VARIABLES
HOW OFTEN DO YOU HAVE A DRINK CONTAINING ALCOHOL: NEVER
HOW MANY STANDARD DRINKS CONTAINING ALCOHOL DO YOU HAVE ON A TYPICAL DAY: PATIENT DOES NOT DRINK
HOW OFTEN DO YOU HAVE SIX OR MORE DRINKS ON ONE OCCASION: NEVER
SKIP TO QUESTIONS 9-10: 1
AUDIT-C TOTAL SCORE: 0

## 2025-06-10 ASSESSMENT — PAIN SCALES - GENERAL: PAINLEVEL_OUTOF10: 0-NO PAIN

## 2025-06-10 NOTE — LETTER
"Neetu 10, 2025     Patient: Kj Hoover   YOB: 1970   Date of Visit: 6/10/2025       To Whom It May Concern:    Kj Hoover was seen in my clinic on 6/10/2025 at 8:00 am. Please excuse Kj for his absence from work on this day to make the appointment.    If you have any questions or concerns, please don't hesitate to call.         Sincerely,         Shaw Hernandez MD        CC: Cristoferfranklyn BLANCHARD Hoover \"Kj Hoover\"  "

## 2025-06-10 NOTE — PROGRESS NOTES
HPI: This is a 54yo F w/ PMH of HLD, tinnitus who comes in for follow up of HP gastritis and celiac disease.     EGD May 2025 c/w HP gastritis and duodenal bx c/w celiac dz. C-scope with isolaed cecal patch and few SSAs.    Per pt since procedure he has avoided gluten, prior to EGD he has had intermittent ingestion of gluten.     Denies abd pain, n/v/d, dysphagia, heartburn, wt loss, 12 point ROS done and neg unless otherwise stated.     Labs Jan-May 2025: Hb 16, plt 253, Cr 0.97, AST 37, ALT mildly elevated 71, ferritin 82, TIBC% 24%, Hep B and C neg celiac panel wih pos TTG IGA, GLIADIN IGA    US abd 3/25:    1. Hepatomegaly with diffuse steatosis and geographic areas around  the gallbladder likely fat spared region.  2. Right lower pole renal cortical echogenic focus measuring 0.5 cm  with diagnostic consideration including calcification, complex cysts  or tiny angiomyolipoma.     PMH/PSH: As above     SH: denies smoking, alcohol, IVDU     FH: denies FH of esophageal, gastric or colon CA     PE:     Gen: AXOX3. NAD      HEENT: NC/AT.       Eyes: anicteric sclerae      CV: RRR     Pulm: non labored breathing     Abd: NTND. Tympanitic. no rebound      Ext: no edema     Skin: Non jaundiced      A/P: This is a 54yo F w/ PMH of HLD, MASLD (fatty liver), tinnitus who comes in for follow up of HP gastritis and celiac disease.     EGD May 2025 c/w HP gastritis and duodenal bx c/w celiac dz. C-scope with isolated cecal patch and few SSAs.    Per pt since procedure he has avoided gluten, prior to EGD he has had intermittent ingestion of gluten. Denies any alarm sx including wt loss, or diarrhea.     Plan:     -start H.pylori stomach infection therapy for 14 days as directed  -obtain lab tests for micronutrient deficiency associated with celiac disease  -PLEASE CALL  /[phone] to schedule urea breath test in August 2025 to confirm eradication of H.pylori infection  -recommend gluten free diet for celiac  disease  -surveillance colonoscopy in 2028    We will see you back in 3 months    -----I will follow this pt for chronic mgmt of celiac disease------

## 2025-06-10 NOTE — PATIENT INSTRUCTIONS
Thank you for coming to GI clinic, we will:    -start H.pylori stomach infection therapy for 14 days as directed  -obtain lab tests for micronutrient deficiency associated with celiac disease  -PLEASE CALL  /[phone] to schedule urea breath test in August 2025 to confirm eradication of H.pylori infection  -recommend gluten free diet for celiac disease  -surveillance colonoscopy in 2028    We will see you back in 3 months

## 2025-06-14 LAB
A-TOCOPHEROL VIT E SERPL-MCNC: 13 MG/L (ref 5.7–19.9)
BETA+GAMMA TOCOPHEROL SERPL-MCNC: <1 MG/L
CAROTENE SERPL-MCNC: 12 MCG/DL (ref 4–51)
COPPER BLD-MCNC: NORMAL UG/DL
FOLATE SERPL-MCNC: 3.6 NG/ML
MAGNESIUM SERPL-MCNC: 2.2 MG/DL (ref 1.5–2.5)
PYRIDOXAL PHOS SERPL-MCNC: 13.5 NG/ML (ref 2.1–21.7)
SELENIUM SERPL-MCNC: 151 MCG/L (ref 63–160)
VIT A SERPL-MCNC: 65 MCG/DL (ref 38–98)
VIT B1 BLD-SCNC: 130 NMOL/L (ref 78–185)
VIT B12 SERPL-MCNC: 442 PG/ML (ref 200–1100)
ZINC SERPL-MCNC: 60 MCG/DL (ref 60–130)

## 2025-06-17 LAB
A-TOCOPHEROL VIT E SERPL-MCNC: 13 MG/L (ref 5.7–19.9)
BETA+GAMMA TOCOPHEROL SERPL-MCNC: <1 MG/L
CAROTENE SERPL-MCNC: 12 MCG/DL (ref 4–51)
COPPER BLD-MCNC: 76 MCG/DL
FOLATE SERPL-MCNC: 3.6 NG/ML
MAGNESIUM SERPL-MCNC: 2.2 MG/DL (ref 1.5–2.5)
PYRIDOXAL PHOS SERPL-MCNC: 13.5 NG/ML (ref 2.1–21.7)
SELENIUM SERPL-MCNC: 151 MCG/L (ref 63–160)
VIT A SERPL-MCNC: 65 MCG/DL (ref 38–98)
VIT B1 BLD-SCNC: 130 NMOL/L (ref 78–185)
VIT B12 SERPL-MCNC: 442 PG/ML (ref 200–1100)
ZINC SERPL-MCNC: 60 MCG/DL (ref 60–130)

## 2025-07-01 ENCOUNTER — APPOINTMENT (OUTPATIENT)
Dept: GASTROENTEROLOGY | Facility: HOSPITAL | Age: 55
End: 2025-07-01
Payer: COMMERCIAL

## 2025-08-13 LAB — UREA BREATH TEST QL: NOT DETECTED

## 2025-08-26 ENCOUNTER — OFFICE VISIT (OUTPATIENT)
Dept: GASTROENTEROLOGY | Facility: HOSPITAL | Age: 55
End: 2025-08-26
Payer: COMMERCIAL

## 2025-08-26 VITALS
DIASTOLIC BLOOD PRESSURE: 81 MMHG | BODY MASS INDEX: 30.07 KG/M2 | SYSTOLIC BLOOD PRESSURE: 127 MMHG | OXYGEN SATURATION: 96 % | TEMPERATURE: 98.2 F | WEIGHT: 209.6 LBS | HEART RATE: 78 BPM

## 2025-08-26 DIAGNOSIS — R19.7 DIARRHEA, UNSPECIFIED TYPE: ICD-10-CM

## 2025-08-26 DIAGNOSIS — K90.0 CELIAC DISEASE (HHS-HCC): Primary | ICD-10-CM

## 2025-08-26 DIAGNOSIS — K58.0 IRRITABLE BOWEL SYNDROME WITH DIARRHEA: ICD-10-CM

## 2025-08-26 DIAGNOSIS — R14.0 BLOATING: ICD-10-CM

## 2025-08-26 PROCEDURE — 3079F DIAST BP 80-89 MM HG: CPT | Performed by: STUDENT IN AN ORGANIZED HEALTH CARE EDUCATION/TRAINING PROGRAM

## 2025-08-26 PROCEDURE — 99215 OFFICE O/P EST HI 40 MIN: CPT | Performed by: STUDENT IN AN ORGANIZED HEALTH CARE EDUCATION/TRAINING PROGRAM

## 2025-08-26 PROCEDURE — 99212 OFFICE O/P EST SF 10 MIN: CPT

## 2025-08-26 PROCEDURE — 3074F SYST BP LT 130 MM HG: CPT | Performed by: STUDENT IN AN ORGANIZED HEALTH CARE EDUCATION/TRAINING PROGRAM

## 2025-08-26 PROCEDURE — 1036F TOBACCO NON-USER: CPT | Performed by: STUDENT IN AN ORGANIZED HEALTH CARE EDUCATION/TRAINING PROGRAM

## 2025-08-26 RX ORDER — LOPERAMIDE HYDROCHLORIDE 2 MG/1
2 CAPSULE ORAL 3 TIMES DAILY PRN
Qty: 90 CAPSULE | Refills: 1 | Status: SHIPPED | OUTPATIENT
Start: 2025-08-26 | End: 2025-10-25

## 2025-08-26 RX ORDER — SIMETHICONE 80 MG
80 TABLET,CHEWABLE ORAL EVERY 6 HOURS PRN
Qty: 180 TABLET | Refills: 2 | Status: SHIPPED | OUTPATIENT
Start: 2025-08-26 | End: 2026-01-08

## 2025-08-26 ASSESSMENT — ENCOUNTER SYMPTOMS
LOSS OF SENSATION IN FEET: 0
OCCASIONAL FEELINGS OF UNSTEADINESS: 0
DEPRESSION: 0

## 2025-08-26 ASSESSMENT — PAIN SCALES - GENERAL: PAINLEVEL_OUTOF10: 0-NO PAIN

## 2025-08-28 LAB
ALBUMIN SERPL-MCNC: 4.7 G/DL (ref 3.6–5.1)
ALBUMIN/GLOB SERPL: 1.6 (CALC) (ref 1–2.5)
ALP SERPL-CCNC: 51 U/L (ref 35–144)
ALT SERPL-CCNC: 40 U/L (ref 9–46)
AST SERPL-CCNC: 25 U/L (ref 10–35)
BILIRUB DIRECT SERPL-MCNC: 0.1 MG/DL
BILIRUB INDIRECT SERPL-MCNC: 0.4 MG/DL (CALC) (ref 0.2–1.2)
BILIRUB SERPL-MCNC: 0.5 MG/DL (ref 0.2–1.2)
ERYTHROCYTE [DISTWIDTH] IN BLOOD BY AUTOMATED COUNT: 14.7 % (ref 11–15)
GLIADIN IGA SER IA-ACNC: 31.8 U/ML
GLIADIN IGG SER IA-ACNC: 7.5 U/ML
GLOBULIN SER CALC-MCNC: 3 G/DL (CALC) (ref 1.9–3.7)
HCT VFR BLD AUTO: 50 % (ref 38.5–50)
HGB BLD-MCNC: 16.7 G/DL (ref 13.2–17.1)
IGA SERPL-MCNC: 320 MG/DL (ref 47–310)
MCH RBC QN AUTO: 30 PG (ref 27–33)
MCHC RBC AUTO-ENTMCNC: 33.4 G/DL (ref 32–36)
MCV RBC AUTO: 89.8 FL (ref 80–100)
PLATELET # BLD AUTO: 292 THOUSAND/UL (ref 140–400)
PMV BLD REES-ECKER: 9 FL (ref 7.5–12.5)
PROT SERPL-MCNC: 7.7 G/DL (ref 6.1–8.1)
RBC # BLD AUTO: 5.57 MILLION/UL (ref 4.2–5.8)
TTG IGA SER-ACNC: 12.1 U/ML
TTG IGG SER-ACNC: <1 U/ML
WBC # BLD AUTO: 4.9 THOUSAND/UL (ref 3.8–10.8)

## 2025-09-04 LAB — CALPROTECTIN STL-MCNT: 698 MCG/G

## 2025-09-16 ENCOUNTER — APPOINTMENT (OUTPATIENT)
Dept: PRIMARY CARE | Facility: CLINIC | Age: 55
End: 2025-09-16
Payer: COMMERCIAL